# Patient Record
Sex: MALE | Employment: OTHER
[De-identification: names, ages, dates, MRNs, and addresses within clinical notes are randomized per-mention and may not be internally consistent; named-entity substitution may affect disease eponyms.]

---

## 2022-07-13 ENCOUNTER — TRANSCRIBE ORDERS (OUTPATIENT)
Dept: OTHER | Age: 72
End: 2022-07-13

## 2022-07-13 DIAGNOSIS — M54.41 ACUTE RIGHT-SIDED LOW BACK PAIN WITH RIGHT-SIDED SCIATICA: Primary | ICD-10-CM

## 2022-07-18 ENCOUNTER — THERAPY VISIT (OUTPATIENT)
Dept: PHYSICAL THERAPY | Facility: CLINIC | Age: 72
End: 2022-07-18
Attending: STUDENT IN AN ORGANIZED HEALTH CARE EDUCATION/TRAINING PROGRAM
Payer: COMMERCIAL

## 2022-07-18 DIAGNOSIS — M54.41 ACUTE RIGHT-SIDED LOW BACK PAIN WITH RIGHT-SIDED SCIATICA: ICD-10-CM

## 2022-07-18 PROCEDURE — 97161 PT EVAL LOW COMPLEX 20 MIN: CPT | Mod: GP | Performed by: PHYSICAL THERAPIST

## 2022-07-18 PROCEDURE — 97110 THERAPEUTIC EXERCISES: CPT | Mod: GP | Performed by: PHYSICAL THERAPIST

## 2022-07-18 NOTE — PROGRESS NOTES
Physical Therapy Initial Evaluation  Subjective:  The history is provided by the patient. No  was used.   Therapist Generated HPI Evaluation  Problem details: About 10 days ago (on or about 7/8/2022) I started to have some right low back pain and right thigh pain.  No specific injury or accident, just came on.  I have been doing a lot of gardening, mowing my yard is a 1/3 of an acre. and watering, weeding. I also work out at the [employer], weights every other day .  My wife has early onset of alzheimer's.   I am taking prednisone which has helped.  I also have balance issues.         Type of problem:  Lumbar.    This is a new condition.  Condition occurred with:  Insidious onset.  Where condition occurred: for unknown reasons.  Patient reports pain:  Lumbar spine right.  Pain is described as aching and is intermittent.  Pain radiates to:  Gluteals right and thigh right (lateral thigh ). Pain is the same all the time.  Since onset symptoms are gradually improving.  Associated with: weakness, with increase in pain  Symptoms are exacerbated by bending (pushing a shopping cart)  Relieved by: prednisone, sauna.  Imaging testing: none.  Previous treatment includes physical therapy (long time ago ).   Work activity restrictions: retired.  Home/work barriers: house, with 1/3 acre with yard.     Patient Health History  Trever Patricio being seen for right low back pain .     Problem began: 7/11/2022 (MD appointment).   Problem occurred: unsure   Pain is reported as 3/10 on pain scale.  General health as reported by patient is good.     Red flags:  None as reported by patient.  Medical allergies: none.   Surgeries include:  None.    Current medications:  Steroids. Other medications details: high blood pressure .    Current occupation is retired.   Primary job tasks include:  Prolonged sitting, prolonged standing, pushing/pulling, repetitive tasks and lifting/carrying.                                     Objective:  Standing Alignment:    Cervical/thoracic deviations alignment: fair posture.    Lumbar:  Lordosis incr        Ankle/Foot:  Pes planus L and pes planus R  General Deviations:  Toe out R and toe out L      Flexibility/Screens:   Positive screens:  Lumbar    Lower Extremity:  Decreased left lower extremity flexibility:Hip Flexors; Quadriceps; Hamstrings and Gastroc    Decreased right lower extremity flexibility:  Hip Flexors; Quadriceps; Hamstrings and Gastroc  Spine:  Decreased left spine flexibility:  Quadratus Lumborum    Decreased right spine flexibility:  Quadratus Lumborum             Lumbar/SI Evaluation  ROM:    AROM Lumbar:   Flexion:        Ankle reps increase in right low back pain   Ext:                    Mode[place]   Side Bend:        Left:     Right:   Rotation:           Left:     Right:   Side Glide:        Left:     Right:           Lumbar Myotomes:    T12-L3 (Hip Flex):  Left: 5    Right: 5  L2-4 (Quads):  Left:  5    Right:  5  L4 (Ankle DF):  Left:  5    Right:  5  L5 (Great Toe Ext): Left: 5    Right: 5   S1 (Toe Raise):  Left: 5    Right: 5        Neural Tension/Mobility:      Left side:SLR; Femoral Nerve or Slump  negative.     Right side:   Femoral Nerve; Slump or SLR  negative.   Lumbar Palpation:    Tenderness present at Left:    Quadratus Lumborum and Vertebral  Tenderness present at Right: Quadratus Lumborum and Vertebral  Functional Tests:  Core strength and proprioception lumbar: balance fair.        Lumbar Provocation:      Left negative with:  PROM hip  Right positive with: Mobility  Right negative with:  PROM hip  Spinal Segmental Conclusions:     Level: Hypo noted at L5, L4 and L3                                                   General     ROS    Assessment/Plan:    Patient is a 71 year old male with lumbar complaints.    Patient has the following significant findings with corresponding treatment plan.                Diagnosis 1:  Right low back pain   Pain -  manual  therapy, self management, education, directional preference exercise and home program  Decreased ROM/flexibility - manual therapy, therapeutic exercise, therapeutic activity and home program  Decreased joint mobility - manual therapy, therapeutic exercise, therapeutic activity and home program  Impaired balance - neuro re-education, therapeutic activities and home program  Impaired muscle performance - neuro re-education and home program  Decreased function - therapeutic activities and home program  Impaired posture - neuro re-education, therapeutic activities and home program  Evaluation ongoing    Therapy Evaluation Codes:   Cumulative Therapy Evaluation is: Low complexity.    Previous and current functional limitations:  (See Goal Flow Sheet for this information)    Short term and Long term goals: (See Goal Flow Sheet for this information)     Communication ability:  Patient appears to be able to clearly communicate and understand verbal and written communication and follow directions correctly.  Treatment Explanation - The following has been discussed with the patient:   RX ordered/plan of care  This patient would benefit from PT intervention to resume normal activities.   Rehab potential is good.    Frequency:  1 X week, once daily  Duration:  for 3 weeks progressing to 1x/ week every other week for 6 weeks  Discharge Plan:  Achieve all LTG.  Independent in home treatment program.    Please refer to the daily flowsheet for treatment today, total treatment time and time spent performing 1:1 timed codes.

## 2022-07-19 PROBLEM — M54.41 ACUTE RIGHT-SIDED LOW BACK PAIN WITH RIGHT-SIDED SCIATICA: Status: ACTIVE | Noted: 2022-07-19

## 2022-07-19 NOTE — PROGRESS NOTES
[facility]    OUTPATIENT Physical Therapy ORTHOPEDIC EVALUATION  PLAN OF TREATMENT FOR OUTPATIENT REHABILITATION  (COMPLETE FOR INITIAL CLAIMS ONLY)  Patient's Last Name, First Name, M.I.  YOB: 1950  Trever Patricio    Provider s Name:  [facility]   Medical Record No.  [id number]   Start of Care Date:  07/18/22   Onset Date:   07/11/22   Type:     _X__PT   ___OT Medical Diagnosis:    Encounter Diagnosis   Name Primary?    Acute right-sided low back pain with right-sided sciatica         Treatment Diagnosis:  right low back pain        Goals:     07/18/22 0724   Body Part   Goals listed below are for right low back pain   Goal #1   Goal #1 pushing/pulling   Previous Functional Level No restrictions   Current Functional Level   (pushing a grocery cart)   Performance level pl 3-4/10   STG [employer]   (pushing a grocery cart)   Performance level pl 1-2/10   Rationale for grocery shopping;for vacuuming;for lawn mowing   Due date 08/15/22   LTG [employer]   (pushing a grocery cart)   Performance Level pl 0/10   Rationale for grocery shopping;for lawn mowing   Due date 08/19/22       Therapy Frequency:  1x/ week then oevery other week  Predicted Duration of Therapy Intervention:  9 weekd    Shasta Blandon, PT                 I CERTIFY THE NEED FOR THESE SERVICES FURNISHED UNDER        THIS PLAN OF TREATMENT AND WHILE UNDER MY CARE .             Physician Signature               Date    X_____________________________________________________                         Certification Date From:  07/18/22   Certification Date To:  09/19/22    Referring Provider:  Gal Wolf    Initial Assessment        See Epic Evaluation SOC Date: 07/18/22

## 2022-07-28 ENCOUNTER — THERAPY VISIT (OUTPATIENT)
Dept: PHYSICAL THERAPY | Facility: CLINIC | Age: 72
End: 2022-07-28
Payer: COMMERCIAL

## 2022-07-28 DIAGNOSIS — M54.41 ACUTE RIGHT-SIDED LOW BACK PAIN WITH RIGHT-SIDED SCIATICA: Primary | ICD-10-CM

## 2022-07-28 PROCEDURE — 97110 THERAPEUTIC EXERCISES: CPT | Mod: 59 | Performed by: PHYSICAL THERAPIST

## 2022-07-28 PROCEDURE — 97140 MANUAL THERAPY 1/> REGIONS: CPT | Mod: 59 | Performed by: PHYSICAL THERAPIST

## 2022-07-28 NOTE — LETTER
[facility]  [address]  [address]  [place] [address]  [phone]    2022    Re: Trever Patricio   :   1950  MRN:  [id number]   REFERRING PHYSICIAN:   Gal RUIZ [facility]  Date of Initial Evaluation:  22  Visits:  Rxs Used: 2  Reason for Referral:  Acute right-sided low back pain with right-sided sciatica  Oswestry Score: 0 %                 PROGRESS  REPORT  Progress reporting period is from 2022 to 2022.       SUBJECTIVE  Subjective changes noted by patient:   I am about 100% better.  I have started to do some of my work outs.     Current Pain level: 0/10.     Previous pain level was  3/10  .   Changes in function:  Yes (See Goal flowsheet attached for changes in current functional level)  Adverse reaction to treatment or activity: None    OBJECTIVE  Changes noted in objective findings:    Objective: TROM flexion ankle, extension moderate.  Tightness in L/E.  Fair understanding of body mechanics.  Increase awareness of posture.  Discussed with patient progression of exercises and home exercise program.       ASSESSMENT/PLAN  Updated problem list and treatment plan: Diagnosis 1:  Right low back pain   Decreased ROM/flexibility - home program  Impaired muscle performance - home program  Decreased function - home program  STG/LTGs have been met or progress has been made towards goals:  Yes (See Goal flow sheet completed today.)  Assessment of Progress: The patient's condition is improving.  The patient's condition has potential to improve.  Self Management Plans:  Patient has been instructed in a home treatment program.  Patient  has been instructed in self management of symptoms.  Recommendations:  Patient to try exercises on his own and return to health club and .  Patient to call if any questions.  Patient still has appointments left and may return if  unable to progress with functional or activity or if pain returns.  Thank you for the opportunity of working with this motivated individual.    Re: Trever Patricio   :   1950      Thank you for your referral.    INQUIRIES  Therapist: Shasta Blandon PT  [address] [address]  Phone: [phone]  Fax: [phone]

## 2022-07-29 NOTE — PROGRESS NOTES
Subjective:  HPI  Physical Exam  Oswestry Score: 0 %                 Objective:  System    Physical Exam    General     ROS    Assessment/Plan:    PROGRESS  REPORT    Progress reporting period is from 7/18/2022 to 7/28/2022.       SUBJECTIVE  Subjective changes noted by patient:   I am about 100% better.  I have started to do some of my work outs.     Current Pain level: 0/10.     Previous pain level was  3/10  .   Changes in function:  Yes (See Goal flowsheet attached for changes in current functional level)  Adverse reaction to treatment or activity: None    OBJECTIVE  Changes noted in objective findings:    Objective: TROM flexion ankle, extension moderate.  Tightness in L/E.  Fair understanding of body mechanics.  Increase awareness of posture.  Discussed with patient progression of exercises and home exercise program.       ASSESSMENT/PLAN  Updated problem list and treatment plan: Diagnosis 1:  Right low back pain   Decreased ROM/flexibility - home program  Impaired muscle performance - home program  Decreased function - home program  STG/LTGs have been met or progress has been made towards goals:  Yes (See Goal flow sheet completed today.)  Assessment of Progress: The patient's condition is improving.  The patient's condition has potential to improve.  Self Management Plans:  Patient has been instructed in a home treatment program.  Patient  has been instructed in self management of symptoms.  Recommendations:  Patient to try exercises on his own and return to health club and .  Patient to call if any questions.  Patient still has appointments left and may return if unable to progress with functional or activity or if pain returns.  Thank you for the opportunity of working with this motivated individual.      Please refer to the daily flowsheet for treatment today, total treatment time and time spent performing 1:1 timed codes.

## 2022-10-05 PROBLEM — M54.41 ACUTE RIGHT-SIDED LOW BACK PAIN WITH RIGHT-SIDED SCIATICA: Status: RESOLVED | Noted: 2022-07-19 | Resolved: 2022-10-05

## 2022-10-05 NOTE — PROGRESS NOTES
Patient did not return for further treatment and no additional progress was noted.  Please refer to the progress note and goal flowsheet completed on 07/28/22 for discharge information.